# Patient Record
Sex: MALE | Race: WHITE | Employment: FULL TIME | URBAN - METROPOLITAN AREA
[De-identification: names, ages, dates, MRNs, and addresses within clinical notes are randomized per-mention and may not be internally consistent; named-entity substitution may affect disease eponyms.]

---

## 2018-11-06 ENCOUNTER — HOSPITAL ENCOUNTER (EMERGENCY)
Facility: CLINIC | Age: 23
Discharge: HOME | End: 2018-11-06
Attending: EMERGENCY MEDICINE | Admitting: EMERGENCY MEDICINE
Payer: OTHER MISCELLANEOUS

## 2018-11-06 VITALS
RESPIRATION RATE: 16 BRPM | HEART RATE: 75 BPM | OXYGEN SATURATION: 99 % | DIASTOLIC BLOOD PRESSURE: 75 MMHG | SYSTOLIC BLOOD PRESSURE: 115 MMHG | TEMPERATURE: 98.2 F

## 2018-11-06 DIAGNOSIS — Z57.8 EMPLOYEE EXPOSURE TO BODY FLUIDS: ICD-10-CM

## 2018-11-06 PROCEDURE — 99283 EMERGENCY DEPT VISIT LOW MDM: CPT | Mod: Z6 | Performed by: EMERGENCY MEDICINE

## 2018-11-06 PROCEDURE — 99283 EMERGENCY DEPT VISIT LOW MDM: CPT | Performed by: EMERGENCY MEDICINE

## 2018-11-06 PROCEDURE — 99281 EMR DPT VST MAYX REQ PHY/QHP: CPT

## 2018-11-06 RX ADMIN — Medication 1 PACKAGE: at 02:40

## 2018-11-06 SDOH — HEALTH STABILITY - PHYSICAL HEALTH: OCCUPATIONAL EXPOSURE TO OTHER RISK FACTORS: Z57.8

## 2018-11-06 NOTE — ED AVS SNAPSHOT
[facility], [place], Emergency Department    [address]    [place] [address]    Phone:  [phone]    Fax:  [phone]                                       Jairo Adame   MRN: [id number]    Department:  [facility], Emergency Department   Date of Visit:  11/6/2018           After Visit Summary Signature Page     I have received my discharge instructions, and my questions have been answered. I have discussed any challenges I see with this plan with the nurse or doctor.    ..........................................................................................................................................  Patient/Patient Representative Signature      ..........................................................................................................................................  Patient Representative Print Name and Relationship to Patient    ..................................................               ................................................  Date                                   Time    ..........................................................................................................................................  Reviewed by Signature/Title    ...................................................              ..............................................  Date                                               Time          22EPIC Rev 08/18
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
## 2018-11-06 NOTE — DISCHARGE INSTRUCTIONS
Use the starter pack until HIV status is known from the source patient.     Please make an appointment to follow up with Employee Health Services (phone: [phone]) in 2-3 days     Return to the ED if you are having fever, or any other urgent/life-threatening concerns.
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
## 2020-03-18 ENCOUNTER — VIRTUAL VISIT (OUTPATIENT)
Dept: FAMILY MEDICINE | Facility: OTHER | Age: 25
End: 2020-03-18

## 2020-03-19 NOTE — PROGRESS NOTES
"Date: 2020 21:35:40  Clinician: Yumiko Hollingsworth  Clinician NPI: [id number]  Patient: Jairo Adame  Patient : 1995  Patient Address: [address], [place] [address]  Patient Phone: [phone]  Visit Protocol: URI  Patient Summary:  Jairo is a 24 year old ( : 1995 ) male who initiated a Visit for COVID-19 (Coronavirus) evaluation and screening. When asked the question \"Please sign me up to receive news, health information and promotions from [employer].\", Jairo responded \"No\".    Jairo states his symptoms started suddenly 3-6 days ago. After his symptoms started, they improved and then got worse again.   His symptoms consist of a sore throat, a cough, nasal congestion, malaise, a headache, rhinitis, enlarged lymph nodes, facial pain or pressure, and tooth pain. He is experiencing mild difficulty breathing with activities but can speak normally in full sentences.   Symptom details     Nasal secretions: The color of his mucus is clear, blood-tinged, white, and green.    Cough: Jairo coughs every 5-10 minutes and his cough is not more bothersome at night. Phlegm comes into his throat when he coughs. He believes his cough is caused by post-nasal drip. The color of the phlegm is white, clear, and green.     Sore throat: Jairo reports having moderate throat pain (4-6 on a 10 point pain scale), does not have exudate on his tonsils, and can swallow liquids. The lymph nodes in his neck are enlarged. A rash has not appeared on the skin since the sore throat started.     Facial pain or pressure: The facial pain or pressure feels worse when bending over or leaning forward.     Headache: He states the headache is mild (1-3 on a 10 point pain scale).     Tooth pain: The tooth pain is not caused by a cavity, recent dental work, or other mouth problems.      Jairo denies having fever, ear pain, myalgias, chills, and wheezing. He also denies having a sinus infection within the past year, " having recent facial or sinus surgery in the past 60 days, and taking antibiotic medication for the symptoms.   Precipitating events  Jairo is not sure if he has been exposed to someone with strep throat. He has not recently been exposed to someone with influenza. Jairo has been in close contact with the following high risk individuals: people with asthma, heart disease or diabetes and adults 65 or older.   Pertinent COVID-19 (Coronavirus) information  Jairo has not traveled internationally or to the areas where COVID-19 (Coronavirus) is widespread, including cruise ship travel in the last 14 days before the start of his symptoms.   Jairo has not had a close contact with a laboratory-confirmed COVID-19 patient within 14 days of symptom onset. He also has not had a close contact with a suspected COVID-19 patient within 14 days of symptom onset.   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   Pertinent medical history  Jairo needs a return to work/school note.   Weight: 166 lbs   Jairo does not smoke or use smokeless tobacco.   Additional information as reported by the patient (free text): Sore throat started first on the 13th March, rapidly got worse over the next day and I started getting sinus congestion. By the  17th the sore throat was gone but my lymph nodes enlarged drastically, I started feeling a bit weak, and my congestion had gotten much worse--I started having to cough to clear mucus. I have aches in my neck and back of head, but otherwise no other concerns. I felt somewhat out of breath walking around my workplace today when normally I don't feel that way.   Weight: 166 lbs    MEDICATIONS: Tylenol PM Extra Strength oral, Excedrin Migraine oral, ALLERGIES: amoxicillin  Clinician Response:  Dear Jairo,   Based on the information you have provided, you do have symptoms that are consistent with Coronavirus (COVID-19).  The coronavirus causes mild to severe respiratory  illness with the most common symptoms including fever, cough and difficulty breathing. Unfortunately, many viruses cause similar symptoms and it can be difficult to distinguish between viruses, especially in mild cases, so we are presuming that anyone with cough or fever has coronavirus at this time.  Coronavirus/COVID-19 has reached the point of community spread in [place], meaning that we are finding the virus in people with no known exposure risk for henry the virus. Given the increasing commonness of coronavirus in the community we are no longer testing patients who are not critically ill.  If you are a health care worker, you should refer to your employee health office for instructions about returning to work.  For everyone else who has cough or fever, you should assume you are infected with coronavirus. Accordingly, you should self-quarantine for seven days from the first day your symptoms started OR 72 hours after your cough and fever completely resolve - WHICHEVER is LONGER. You should call if you find increasing shortness of breath, wheezing or sustained fever above 101.5. If you are significantly short of breath or experience chest pain you should call 911 or report to the nearest emergency department for urgent evaluation.    Isolate yourself at home.   Do Not allow any visitors  Do Not go to work or school  Do Not go to [employer],  centers, shopping, or other public places.  Do Not shake hands.  Avoid close contact with others (hugging, kissing).   Protect Others:    Cover Your Mouth and Nose with a mask, disposable tissue or wash cloth to avoid spreading germs to others.  Wash your hands and face frequently with soap and water.   If you develop significant shortness of breath that prevents you from doing normal activities, please call 911 or proceed to the nearest emergency room and alert them immediately that you have been in self-isolation for possible coronavirus.   For more  information about COVID19 and options for caring for yourself at home, please visit the CDC website at https://www.cdc.gov/coronavirus/2019-ncov/about/steps-when-sick.htmlFor more options for care at [facility], please visit our website at [email or web address]     COVID-19 (Coronavirus) General Information  With the increase in the number of COVID-19 (Coronavirus) cases, we understand you may have some questions. Below is some helpful information on COVID-19 (Coronavirus).  How can I protect myself and others from the COVID-19 (Coronavirus)?  Because there is currently no vaccine to prevent infection, the best way to protect yourself is to avoid being exposed to this virus. Put distance between yourself and other people if COVID-19 (Coronavirus) is spreading in your community. The virus is thought to spread mainly from person-to-person.     Between people who are in close contact with one another (within about 6 about) for a prolonged period (10 minutes or longer).    Through respiratory droplets produced when an infected person coughs or sneezes.     The CDC recommends the following additional steps to protect yourself and others:     Wash your hands often with soap and water for at least 20 seconds, especially after blowing your nose, coughing, or sneezing; going to the bathroom; and before eating or preparing food.  Use an alcohol-based hand  that contains at least 60 percent alcohol if soap and water are not available.        Avoid touching your eyes, nose and mouth with unwashed hands.    Avoid close contact with people who are sick.    Stay home when you are sick.    Cover your cough or sneeze with a tissue, then throw the tissue in the trash.    Clean and disinfect frequently touched objects and surfaces.     You can help stop COVID-19 (Coronavirus) by knowing the signs and symptoms:     Fever    Cough    Shortness of breath     Contact your healthcare provider if    Develop symptoms   AND   Have been in close contact with a person known to have COVID-19 (Coronavirus) or live in or have recently traveled from an area with ongoing spread of COVID-19 (Coronavirus). Call ahead before you go to a doctor's office or emergency room. Tell them about your recent travel and your symptoms.   For the most up to date information, visit the CDC's website.  Self-monitoring  Self-monitoring means people should monitor themselves for fever by taking their temperatures twice a day and remain alert for a cough or difficulty breathing.  It is important to check your health two times each day for 14 days after a potential exposure to a person with COVID-19 (Coronavirus) or after travel from a location where COVID-19 (Coronavirus) is widespread. If you have been exposed to a person with COVID-19 (Coronavirus), it may take up to 14 days to know if you will get sick. Follow the steps below to check and record your health.     Take your temperature with a thermometer twice a day, once in the morning and once in the evening, and watch for a cough or difficulty breathing for 14 days.    Write down your temperature and any COVID-19 symptoms you may have: feeling feverish, coughing, or difficulty breathing.    Stay home from work or school.    Do not take public transportation, taxis, or ride-shares.    Avoid crowded places (such as shopping centers and movie theaters) and limit your activities in public.    Keep your distance from others (about 6 feet or 2 meters).    If you get sick with fever, cough, or trouble breathing, contact your healthcare provider and tell them about your recent travel and/or your symptoms.    If you need to seek medical care for other reasons, such as dialysis, call ahead to your doctor and tell them about your recent travel.     Steps to help prevent the spread of COVID-19 (Coronavirus) if you are sick  If you are sick with COVID-19 (Coronavirus) or suspect you are infected  "with the virus that causes COVID-19 (Coronavirus), follow the steps below to help prevent the disease from spreading&nbsp;to people in your home and community.     Stay home except to get medical care. Home isolation may be started in consultation with your healthcare clinician.    Separate yourself from other people and animals in your home.    Call ahead before visiting your doctor if you have a medical appointment.    Wear a facemask when you are around other people.    Cover your cough and sneezes.    Clean your hands often.    Avoid sharing personal household items.    Clean and disinfect frequently touched objects and surfaces everyday.    You will need to have someone drop off medications or household supplies (if needed) at your house without coming inside or in contact with you or others living in your house.    Monitor your symptoms and seek prompt medical care if your illness is worsening (e.g. Difficulty breathing).    Discontinue home isolation only in consultation with your healthcare provider.     For more detailed and up to date information on what to do if you are sick, visit this link: What to Do If You Are Sick With Coronavirus Disease 2019 (COVID-19).  Do I need to be tested for COVID-19 (Coronavirus)?     At this time, the limited number of available tests are controlled by the state and local health departments and are being reserved for more seriously ill patients, those with known exposure to confirmed patients, and those with recent travel (within 14 days) to countries with high rates of COVID-19 (Coronavirus).    Decisions on which patients receive testing will be based on the local spread of COVID-19 (Coronavirus) as well as the symptoms. Your healthcare provider will make the final decision on whether you should be tested.    In the meantime, if you have concerns that you may have been exposed, it is reasonable to practice \"social distancing.\"&nbsp; If you are ill with a cold or flu-like " illness, please monitor your symptoms and reach out to your healthcare provider if your symptoms worsen.    For more up to date information, visit this link: COVID-19 (Coronavirus) Frequently Asked Questions and Answers.      Diagnosis: Cough  Diagnosis ICD: R05  Prescription: albuterol sulfate (ProAir HFA) 90 mcg/actuation inhalation HFA aerosol inhaler 1 200 inhalation canister (proair hfa or equivalent), 0 days supply. Inhale 2 puffs every 4 hours as needed for shortness of breath or wheezing. Refills: 0, Refill as needed: no, Allow substitutions: yes

## 2020-04-28 ENCOUNTER — VIRTUAL VISIT (OUTPATIENT)
Dept: URGENT CARE | Facility: CLINIC | Age: 25
End: 2020-04-28
Payer: COMMERCIAL

## 2020-04-28 ENCOUNTER — HOSPITAL ENCOUNTER (EMERGENCY)
Facility: CLINIC | Age: 25
Discharge: HOME | End: 2020-04-28
Attending: EMERGENCY MEDICINE | Admitting: EMERGENCY MEDICINE
Payer: COMMERCIAL

## 2020-04-28 ENCOUNTER — NURSE TRIAGE (OUTPATIENT)
Dept: NURSING | Facility: CLINIC | Age: 25
End: 2020-04-28

## 2020-04-28 VITALS
TEMPERATURE: 96.3 F | DIASTOLIC BLOOD PRESSURE: 77 MMHG | SYSTOLIC BLOOD PRESSURE: 130 MMHG | HEART RATE: 74 BPM | OXYGEN SATURATION: 96 %

## 2020-04-28 DIAGNOSIS — L30.9 DERMATITIS: ICD-10-CM

## 2020-04-28 DIAGNOSIS — M65.941 TENOSYNOVITIS OF RIGHT HAND: Primary | ICD-10-CM

## 2020-04-28 PROCEDURE — 99207 ZZC NO BILLABLE SERVICE THIS VISIT: CPT | Performed by: STUDENT IN AN ORGANIZED HEALTH CARE EDUCATION/TRAINING PROGRAM

## 2020-04-28 PROCEDURE — 99282 EMERGENCY DEPT VISIT SF MDM: CPT | Performed by: EMERGENCY MEDICINE

## 2020-04-28 PROCEDURE — 99283 EMERGENCY DEPT VISIT LOW MDM: CPT | Mod: Z6 | Performed by: EMERGENCY MEDICINE

## 2020-04-28 RX ORDER — TRIAMCINOLONE ACETONIDE 1 MG/G
CREAM TOPICAL 2 TIMES DAILY
Qty: 30 G | Refills: 0 | Status: SHIPPED | OUTPATIENT
Start: 2020-04-28

## 2020-04-28 ASSESSMENT — ENCOUNTER SYMPTOMS
ARTHRALGIAS: 0
EYE REDNESS: 0
COLOR CHANGE: 1
NECK STIFFNESS: 0
CONFUSION: 0
SHORTNESS OF BREATH: 0
ABDOMINAL PAIN: 0
HEADACHES: 0
DIFFICULTY URINATING: 0
FEVER: 0

## 2020-04-28 NOTE — ED AVS SNAPSHOT
[facility], [place], Emergency Department  [address]  [facility] [place] [address]  Phone:  [phone]  Fax:  [phone]                                    Jairo Adame   MRN: [id number]    Department:  [facility], Emergency Department   Date of Visit:  4/28/2020           After Visit Summary Signature Page    I have received my discharge instructions, and my questions have been answered. I have discussed any challenges I see with this plan with the nurse or doctor.    ..........................................................................................................................................  Patient/Patient Representative Signature      ..........................................................................................................................................  Patient Representative Print Name and Relationship to Patient    ..................................................               ................................................  Date                                   Time    ..........................................................................................................................................  Reviewed by Signature/Title    ...................................................              ..............................................  Date                                               Time          22EPIC Rev 08/18

## 2020-04-28 NOTE — PATIENT INSTRUCTIONS
"- Please go to the ED for further evaluation as soon as possible as we have discussed.      Patient Education     Tendonitis and Tenosynovitis  What are tendonitis and tenosynovitis?  Tendons are strong cords of tissue that connect muscles to bones. Tendonitis is when a tendon is inflamed. It can happen to any tendon in the body. When a tendon is inflamed, it can cause swelling, pain, and discomfort.   Another problem called tenosynovitis is linked to tendonitis. This is the inflammation of the lining of the tendon sheath around a tendon. Often the sheath itself is inflamed, but both the sheath and the tendon can be inflamed at the same time.  Common types of these tendon problems include:    Lateral epicondylitis. This is most often known as tennis elbow. It causes pain to the side of the elbow and forearm, along the thumb side of the arm. The pain is caused by damage to the tendons that bend the wrist back and away from the palm.    Medial epicondylitis. This is most often known as golfer's or baseball elbow. It causes pain from the elbow to the wrist on the palm side of the forearm. The pain is caused by damage to the tendons that bend the wrist toward the palm.    Rotator cuff tendonitis. This is a shoulder disorder. It causes inflammation of the shoulder capsule and related tendons.    DeQuervain tenosynovitis. This is a common tenosynovitis disorder. It causes swelling in the tendon sheath of the tendons of the thumb.    Trigger finger or trigger thumb. This is a type of tenosynovitis. The tendon sheath becomes inflamed and thickened. This makes it hard to extend or flex the finger or thumb. The finger or thumb may lock or \"trigger\" suddenly.  What causes tendonitis and tenosynovitis?  The cause of tendonitis and tenosynovitis is often not known. They may be caused by strain, overuse, injury, or too much exercise. They may also be linked to a disease such as diabetes, rheumatoid arthritis, or infection.  What " are the symptoms of tendonitis and tenosynovitis?  Symptoms may include:    Pain in the tendon when moved    Swelling from fluid and inflammation    A grating feeling when moving the joint  The symptoms of tendonitis can seem like other health problems. Talk with your healthcare provider for a diagnosis.  How are tendonitis and tenosynovitis diagnosed?  Your healthcare provider will ask about your health history and give you a physical exam. You may have tests to check for other problems that may be causing your symptoms. The tests may include:    Joint aspiration. The healthcare provider uses a needle to take a small amount of fluid from the joint. The fluid is tested to check for gout or signs of an infection.    X-ray. A small amount of radiation is used to make an image. Tendons can t be seen on an X-ray, but they can show bone. This test can check for arthritis, calcifications, and other problems.  How are tendonitis and tenosynovitis treated?  Treatment may include:    Changing your activities    Icing the area to reduce inflammation and pain. To make a cold pack, put ice cubes in a plastic bag that seals at the top. Wrap the bag in a clean, thin towel or cloth.    Putting a splint on the area to limit movement    Steroid injections to reduce inflammation and pain    Nonsteroidal anti-inflammatory medicine (called NSAIDs) to reduce inflammation and pain    Antibiotics if due to infection    Surgery if other treatments don't work  Key points about tendonitis and tenosynovitis    Tendonitis is when a tendon is inflamed. It can cause swelling, pain, and discomfort.    Another problem called tenosynovitis is linked to tendonitis. This is the inflammation of the lining of the tendon sheath around a tendon.    Common types of tendon problems include rotator cuff tendonitis and trigger finger or trigger thumb.    Tendonitis can be caused by strain, overuse, injury, and too much exercise.    Treatment may include  changing your activities, icing the area to reduce pain, and using a splint to limit movement.    Next steps  Tips to help you get the most from a visit to your healthcare provider:    Know the reason for your visit and what you want to happen.    Before your visit, write down questions you want answered.    Bring someone with you to help you ask questions and remember what your provider tells you.    At the visit, write down the name of a new diagnosis, and any new medicines, treatments, or tests. Also write down any new instructions your provider gives you.    Know why a new medicine or treatment is prescribed, and how it will help you. Also know what the side effects are.    Ask if your condition can be treated in other ways.    Know why a test or procedure is recommended and what the results could mean.    Know what to expect if you do not take the medicine or have the test or procedure.    If you have a follow-up appointment, write down the date, time, and purpose for that visit.    Know how you can contact your provider if you have questions.    1843-1037 [employer]. [address] [place] [address], [place] [address]. All rights reserved. This information is not intended as a substitute for professional medical care. Always follow your healthcare professional's instructions.

## 2020-04-28 NOTE — PROGRESS NOTES
"Jairo Adame is a 25 year old male who is being evaluated via a billable telephone visit.      The patient has been notified of following:     \"This telephone visit will be conducted via a call between you and your physician/provider. We have found that certain health care needs can be provided without the need for a physical exam.  This service lets us provide the care you need with a short phone conversation.  If a prescription is necessary we can send it directly to your pharmacy.  If lab work is needed we can place an order for that and you can then stop by our lab to have the test done at a later time.    Telephone visits are billed at different rates depending on your insurance coverage. During this emergency period, for some insurers they may be billed the same as an in-person visit.  Please reach out to your insurance provider with any questions.    If during the course of the call the physician/provider feels a telephone visit is not appropriate, you will not be charged for this service.\"    Patient has given verbal consent for Telephone visit?  Yes    How would you like to obtain your AVS? Vivianhart    Subjective     Jairo Adame is a 25 year old male who presents to clinic today for the following health issues: Swollen right hand    HPI  Patient is a 25-year-old previously healthy male who comes in today for swelling of right hand.  Patient reports that he woke up around midnight and noted that he had swelling in his right thumb as well as his right middle finger.  He initially thought about going to the emergency room because of this but instead decided against this and went to bed.  This morning, he noted that his fingers are more swollen.  He denies swelling of the palms of his hands but has noted some open \"red blotchy spot\" on his left hand.  He denies any swelling, discomfort in his left hand.  Otherwise, patient denies any other areas of redness, denies any skin openings.  He has not had any " prior history of this.  Denies any history of trauma or bites or any skin openings.  Denies any fevers or chills.  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  He reports some mild numbness and tingling over the swollen parts but no other sensory issues or weakness anywhere.    There are no active problems to display for this patient.    No past medical history on file.    Allergies   Allergen Reactions     Molds & Smuts      Amoxicillin Rash     No current outpatient medications on file.     No current facility-administered medications for this visit.      Reviewed and updated as needed this visit by Provider    Review of Systems   ROS COMP: Constitutional, HEENT, cardiovascular, pulmonary, gi and gu systems are negative, except as otherwise noted.     Objective   Reported vitals:  None  Gen Appearance: Healthy, in NAD  Psych: Alert, normal speech, normal thought content  RESP: No cough, no audible wheezing, able to talk in full sentences  MSK: Reports swelling over right thumb and right middle finger, unable to fully make a fist.     Diagnostic Test Results:  Labs reviewed in Epic      Assessment/Plan:  Diagnoses and all orders for this visit:    Tenosynovitis of right hand  Patient is a 25-year-old previously healthy male coming in with concerns over right thumb and right middle finger swelling of about 9-hour duration.  Sent him pictures for review however not very helpful.  There does seem to be swelling of his right thumb along the palmar surface with some redness noted.  Suspect that this is most likely due to tenosynovitis.  Discussed that we are unable to fully assess his exam because of this being a virtual visit.  If this is indeed tenosynovitis, would need IV antibiotics and possible drainage of abscess if there is any.  Discussed this at length with patient.  He is agreeable with plan.  Plans to go to the [facility] ED as soon as possible. Of note, patient has hx of allergies  to Amoxicillin.     No follow-ups on file.    Phone call duration:  21 minutes    I, Buffy Ramirez, have discussed patient findings with attending physician Dr. Dwayne Aguilar who was agreeable with plan.     Buffy Ramirez MD    I was present during the key/critical portion of the telephone visit. The patient acknowledged that I participated in the telephone conversation. I discussed the case with the resident and agree with the note as documented by the resident.    I personally spent a total of 3 minutes speaking with Jairo Adame during today s visit.     wDayne Aguilar MD  4/28/2020 at 10:24 AM

## 2020-04-28 NOTE — ED PROVIDER NOTES
[facility] EMERGENCY DEPARTMENT ([facility])    4/28/20   ED 10    History     Chief Complaint   Patient presents with     Joint Swelling     thumb and finger just started swelling: no tramua     The history is provided by the patient and medical records.     Jairo Adame is a 25 year old male who presents with itching and redness to palmar aspect of his hands over the past 2 days, as well as swelling of his thumbs that started last night. He noticed his fingers are swelling up over the past 2 days. The thumb swelling started at around midnight last night, got worse and now his fingers are stiff and swollen. He thought it was due to working out. He has blotchy redness over the palms of his hands, and it is itchy. His thumbs are itchy and painful as well. He developed pain in his thumb this morning. The redness is spreading down from the thumb. He otherwise has been healthy. No fevers, shaking rigors or sweats. No hand injuries but may have . 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 He states he has noticed his hands drying out more after sanitizing his hands frequently at work. He tried lidocaine cream and icing his hands without improvement.     I have reviewed the Medications, Allergies, Past Medical and Surgical History, and Social History in the [employer] system.  PAST MEDICAL HISTORY: History reviewed. No pertinent past medical history.    PAST SURGICAL HISTORY: No past surgical history on file.    Past medical history, past surgical history, medications, and allergies were reviewed with the patient. Additional pertinent items: None    FAMILY HISTORY: No family history on file.    SOCIAL HISTORY:   Social History     Tobacco Use     Smoking status: Never Smoker     Smokeless tobacco: Never Used   Substance Use Topics     Alcohol use: Yes     Comment: socially     Social history was reviewed with the patient. Additional pertinent items: None      Discharge  Medication List as of 4/28/2020 10:51 AM      START taking these medications    Details   triamcinolone (KENALOG) 0.1 % external cream Apply topically 2 times daily For 10 daysDisp-30 g,R-0Local Print                Allergies   Allergen Reactions     Molds & Smuts      Amoxicillin Rash        Review of Systems   Constitutional: Negative for fever.   HENT: Negative for congestion.    Eyes: Negative for redness.   Respiratory: Negative for shortness of breath.    Cardiovascular: Negative for chest pain.   Gastrointestinal: Negative for abdominal pain.   Genitourinary: Negative for difficulty urinating.   Musculoskeletal: Negative for arthralgias and neck stiffness.        Thumb swelling   Skin: Positive for color change (redness in hands).   Neurological: Negative for headaches.   Psychiatric/Behavioral: Negative for confusion.     A complete review of systems was performed with pertinent positives and negatives noted in the HPI, and all other systems negative.    Physical Exam   BP: 130/77  Pulse: 74  Temp: 96.3  F (35.7  C)  SpO2: 96 %      Physical Exam  Constitutional:       General: He is not in acute distress.     Appearance: He is not diaphoretic.   HENT:      Head: Atraumatic.   Eyes:      General: No scleral icterus.     Pupils: Pupils are equal, round, and reactive to light.   Cardiovascular:      Heart sounds: Normal heart sounds.   Pulmonary:      Effort: No respiratory distress.      Breath sounds: Normal breath sounds.   Abdominal:      General: Bowel sounds are normal.      Palpations: Abdomen is soft.      Tenderness: There is no abdominal tenderness.   Musculoskeletal:         General: No tenderness.   Skin:     General: Skin is warm.      Findings: Erythema and rash present.             ED Course        Procedures             No results found for this or any previous visit (from the past 24 hour(s)).  Medications - No data to display          Assessments & Plan (with Medical Decision Making)    25-year-old male who presents for evaluation of bilateral itching and redness of the hands.  Differential included dermatitis, connective tissue disease, vasculitis, dermatomyositis, endocarditis, cellulitis.  Exam revealed at least 5 discrete areas on bilateral volar surfaces of hands as noted in picture above.  Signs and symptoms consistent with dermatitis possibly from a chemical contact.  I have recommended hand moisturizer and triamcinolone with follow-up by primary physician or dermatology in 10 days if symptoms do not fully resolve.  I have reviewed the nursing notes.    I have reviewed the findings, diagnosis, plan and need for follow up with the patient.    Discharge Medication List as of 4/28/2020 10:51 AM      START taking these medications    Details   triamcinolone (KENALOG) 0.1 % external cream Apply topically 2 times daily For 10 daysDisp-30 g,R-0Local Print             Final diagnoses:   Contact dermatitis   Dermatitis   I, Crystal Lawrence, am serving as a trained medical scribe to document services personally performed by Bunny Mitchell MD based on the provider's statements to me on April 28, 2020.  This document has been checked and approved by the attending provider.    I, Bunny Mitchell MD, was physically present and have reviewed and verified the accuracy of this note documented by Crystal Lawrence, medical scribe.       4/28/2020   [facility], [place], EMERGENCY DEPARTMENT     Michael Mitchell MD  04/28/20 6699

## 2020-04-28 NOTE — TELEPHONE ENCOUNTER
"Thumb on right had is swelling up, started at 11pm and then an hour ago, his middle finger on the same hand is swelling too. It seems to be just the top pad of finger    Woke up feeling like thumb had fallen asleep.  Uncomfortable, not painful with numbness and tingling  Worse with movement     Patient states he also has a red itchy area \"below the skin\" on the left hand on the palm for 3 days.   -unsure if related    Thinks pulled a muscle in left arm while running, doesn't think it is related    No fever  No known injury  No  Cough  No shortness of breath  No known exposure to COVID19  No travel history    Triaged to a disposition of See a Physician within 24 hours. Patient is agreeable and call transferred to scheduling for telephone visit.     COVID 19 Nurse Triage Plan/Patient Instructions    Please be aware that novel coronavirus (COVID-19) may be circulating in the community. If you develop symptoms such as fever, cough, or SOB or if you have concerns about the presence of another infection including coronavirus (COVID-19), please contact your health care provider or visit [email or web address].     Disposition/Instructions    Patient to have an Urgent Care Telephone Visit with a provider. Follow System Ambulatory Workflow for COVID 19.     Urgent Care Telephone Visits are available between the hours of 8 am to 9 pm. Staff will assist patent in scheduling an appointment for this Urgent Care Telephone Visit.     Call Back If: Your symptoms worsen before you are able to complete your Urgent Care Telephone Visit with a provider.  Patient to have scheduled Telephone Visit with a provider. Follow System Ambulatory Workflow for COVID 19.     The clinic staff will assist you to schedule an appointment to complete the Telephone Visit with a provider during normal clinic hours.       Call Back If: Your symptoms worsen before you are able to complete your Telephone Visit with a provider.    Thank you for limiting contact with " others, wearing a simple mask to cover your cough, practice good hand hygiene habits and accessing our virtual services where possible to limit the spread of this virus.    For more information about COVID19 and options for caring for yourself at home, please visit the CDC website at https://www.cdc.gov/coronavirus/2019-ncov/about/steps-when-sick.html  For more options for care at [facility], please visit our website at [email or web address]    For more information, please use the [place] Department of Health COVID-19 Website: [email or web address]/coronavirus/index.html  [place] Department of Health ([facility]) COVID-19 Hotlines (Interpreters available):      Health questions: Phone Number: [phone] or 1-486.296.7456 and Hours: 7 a.m. to 7 p.m.    Schools and  questions: Phone Number: [phone] or [phone] and Hours 7 a.m. to 7 p.m.    Reason for Disposition    [1] Numbness (i.e., loss of sensation) in hand or fingers AND [2] new-onset    [1] Swollen joint AND [2] no fever or redness    Additional Information    Negative: Followed a finger injury    Negative: Wound looks infected    Negative: Caused by an animal bite    Negative: Caused by frostbite    Negative: Caused by a human bite    Negative: Hand or wrist pain is main symptom    Negative: [1] Swollen joint AND [2] fever    Negative: [1] Looks infected (spreading redness, red streak, pus) AND [2] fever    Negative: [1] Looks infected (spreading redness, red streak, pus) AND [2] severe pain with movement    Negative: Patient sounds very sick or weak to the triager    Negative: [1] Looks infected (spreading redness, red streak, pus) AND [2] large red area (more than 2 in or 5 cm, or entire finger)    Negative: [1] SEVERE pain (e.g., excruciating) AND [2] not improved after 2 hours of pain medicine    Protocols used: FINGER PAIN-A-    Sayra Jones RN on 4/28/2020 at 2:27 AM

## 2021-01-03 ENCOUNTER — HEALTH MAINTENANCE LETTER (OUTPATIENT)
Age: 26
End: 2021-01-03

## 2021-10-10 ENCOUNTER — HEALTH MAINTENANCE LETTER (OUTPATIENT)
Age: 26
End: 2021-10-10

## 2022-01-29 ENCOUNTER — HEALTH MAINTENANCE LETTER (OUTPATIENT)
Age: 27
End: 2022-01-29

## 2022-09-18 ENCOUNTER — HEALTH MAINTENANCE LETTER (OUTPATIENT)
Age: 27
End: 2022-09-18

## 2023-05-07 ENCOUNTER — HEALTH MAINTENANCE LETTER (OUTPATIENT)
Age: 28
End: 2023-05-07